# Patient Record
Sex: FEMALE | Race: WHITE | Employment: FULL TIME | URBAN - METROPOLITAN AREA
[De-identification: names, ages, dates, MRNs, and addresses within clinical notes are randomized per-mention and may not be internally consistent; named-entity substitution may affect disease eponyms.]

---

## 2019-03-02 ENCOUNTER — LAB ENCOUNTER (OUTPATIENT)
Dept: LAB | Age: 44
End: 2019-03-02
Attending: FAMILY MEDICINE
Payer: COMMERCIAL

## 2019-03-02 ENCOUNTER — OFFICE VISIT (OUTPATIENT)
Dept: FAMILY MEDICINE CLINIC | Facility: CLINIC | Age: 44
End: 2019-03-02

## 2019-03-02 VITALS
WEIGHT: 147.19 LBS | DIASTOLIC BLOOD PRESSURE: 72 MMHG | HEART RATE: 58 BPM | TEMPERATURE: 99 F | SYSTOLIC BLOOD PRESSURE: 110 MMHG | BODY MASS INDEX: 24.82 KG/M2 | HEIGHT: 64.5 IN

## 2019-03-02 DIAGNOSIS — Z01.419 ENCOUNTER FOR GYNECOLOGICAL EXAMINATION: ICD-10-CM

## 2019-03-02 DIAGNOSIS — Z12.31 ENCOUNTER FOR SCREENING MAMMOGRAM FOR BREAST CANCER: ICD-10-CM

## 2019-03-02 DIAGNOSIS — Z00.00 ENCOUNTER FOR HEALTH MAINTENANCE EXAMINATION IN ADULT: Primary | ICD-10-CM

## 2019-03-02 DIAGNOSIS — Z12.11 ENCOUNTER FOR SCREENING FECAL OCCULT BLOOD TESTING: ICD-10-CM

## 2019-03-02 DIAGNOSIS — Z00.00 ENCOUNTER FOR HEALTH MAINTENANCE EXAMINATION IN ADULT: ICD-10-CM

## 2019-03-02 LAB
ALBUMIN SERPL-MCNC: 4.3 G/DL (ref 3.4–5)
ALBUMIN/GLOB SERPL: 1.4 {RATIO} (ref 1–2)
ALP LIVER SERPL-CCNC: 43 U/L (ref 37–98)
ALT SERPL-CCNC: 22 U/L (ref 13–56)
ANION GAP SERPL CALC-SCNC: 7 MMOL/L (ref 0–18)
AST SERPL-CCNC: 34 U/L (ref 15–37)
BASOPHILS # BLD AUTO: 0.09 X10(3) UL (ref 0–0.2)
BASOPHILS NFR BLD AUTO: 1.4 %
BILIRUB SERPL-MCNC: 0.4 MG/DL (ref 0.1–2)
BUN BLD-MCNC: 13 MG/DL (ref 7–18)
BUN/CREAT SERPL: 15.9 (ref 10–20)
CALCIUM BLD-MCNC: 8.9 MG/DL (ref 8.5–10.1)
CHLORIDE SERPL-SCNC: 106 MMOL/L (ref 98–107)
CHOLEST SMN-MCNC: 202 MG/DL (ref ?–200)
CO2 SERPL-SCNC: 26 MMOL/L (ref 21–32)
CREAT BLD-MCNC: 0.82 MG/DL (ref 0.55–1.02)
DEPRECATED RDW RBC AUTO: 39.2 FL (ref 35.1–46.3)
EOSINOPHIL # BLD AUTO: 0.15 X10(3) UL (ref 0–0.7)
EOSINOPHIL NFR BLD AUTO: 2.3 %
ERYTHROCYTE [DISTWIDTH] IN BLOOD BY AUTOMATED COUNT: 11.9 % (ref 11–15)
GLOBULIN PLAS-MCNC: 3.1 G/DL (ref 2.8–4.4)
GLUCOSE BLD-MCNC: 85 MG/DL (ref 70–99)
HCT VFR BLD AUTO: 37.9 % (ref 35–48)
HDLC SERPL-MCNC: 97 MG/DL (ref 40–59)
HGB BLD-MCNC: 12.5 G/DL (ref 12–16)
IMM GRANULOCYTES # BLD AUTO: 0.08 X10(3) UL (ref 0–1)
IMM GRANULOCYTES NFR BLD: 1.3 %
LDLC SERPL CALC-MCNC: 97 MG/DL (ref ?–100)
LYMPHOCYTES # BLD AUTO: 1.23 X10(3) UL (ref 1–4)
LYMPHOCYTES NFR BLD AUTO: 19.2 %
M PROTEIN MFR SERPL ELPH: 7.4 G/DL (ref 6.4–8.2)
MCH RBC QN AUTO: 29.8 PG (ref 26–34)
MCHC RBC AUTO-ENTMCNC: 33 G/DL (ref 31–37)
MCV RBC AUTO: 90.5 FL (ref 80–100)
MONOCYTES # BLD AUTO: 0.57 X10(3) UL (ref 0.1–1)
MONOCYTES NFR BLD AUTO: 8.9 %
NEUTROPHILS # BLD AUTO: 4.27 X10 (3) UL (ref 1.5–7.7)
NEUTROPHILS # BLD AUTO: 4.27 X10(3) UL (ref 1.5–7.7)
NEUTROPHILS NFR BLD AUTO: 66.9 %
NONHDLC SERPL-MCNC: 105 MG/DL (ref ?–130)
OSMOLALITY SERPL CALC.SUM OF ELEC: 287 MOSM/KG (ref 275–295)
PLATELET # BLD AUTO: 324 10(3)UL (ref 150–450)
POTASSIUM SERPL-SCNC: 4.9 MMOL/L (ref 3.5–5.1)
RBC # BLD AUTO: 4.19 X10(6)UL (ref 3.8–5.3)
SODIUM SERPL-SCNC: 139 MMOL/L (ref 136–145)
T4 FREE SERPL-MCNC: 0.9 NG/DL (ref 0.8–1.7)
TRIGL SERPL-MCNC: 41 MG/DL (ref 30–149)
TSI SER-ACNC: 2.74 MIU/ML (ref 0.36–3.74)
VIT B12 SERPL-MCNC: 547 PG/ML (ref 193–986)
VIT D+METAB SERPL-MCNC: 26.7 NG/ML (ref 30–100)
VLDLC SERPL CALC-MCNC: 8 MG/DL (ref 0–30)
WBC # BLD AUTO: 6.4 X10(3) UL (ref 4–11)

## 2019-03-02 PROCEDURE — 80061 LIPID PANEL: CPT

## 2019-03-02 PROCEDURE — 99386 PREV VISIT NEW AGE 40-64: CPT | Performed by: NURSE PRACTITIONER

## 2019-03-02 PROCEDURE — 84443 ASSAY THYROID STIM HORMONE: CPT

## 2019-03-02 PROCEDURE — 87624 HPV HI-RISK TYP POOLED RSLT: CPT | Performed by: NURSE PRACTITIONER

## 2019-03-02 PROCEDURE — 36415 COLL VENOUS BLD VENIPUNCTURE: CPT

## 2019-03-02 PROCEDURE — 85025 COMPLETE CBC W/AUTO DIFF WBC: CPT

## 2019-03-02 PROCEDURE — G0439 PPPS, SUBSEQ VISIT: HCPCS | Performed by: NURSE PRACTITIONER

## 2019-03-02 PROCEDURE — 80053 COMPREHEN METABOLIC PANEL: CPT

## 2019-03-02 PROCEDURE — 82306 VITAMIN D 25 HYDROXY: CPT

## 2019-03-02 PROCEDURE — 88175 CYTOPATH C/V AUTO FLUID REDO: CPT | Performed by: NURSE PRACTITIONER

## 2019-03-02 PROCEDURE — 82607 VITAMIN B-12: CPT

## 2019-03-02 PROCEDURE — 84439 ASSAY OF FREE THYROXINE: CPT

## 2019-03-02 NOTE — PROGRESS NOTES
HPI:    Patient ID: Froy Painting is a 38year old female. HPI patient patient presents today as a new patient returning from several years ago. Overall she feels well, denies complaints. She has not had a visit in 4 years.   She would like a Pap sm normal, S2 normal, normal heart sounds, intact distal pulses and normal pulses. No murmur heard. Pulses:       Dorsalis pedis pulses are 2+ on the right side, and 2+ on the left side.         Posterior tibial pulses are 2+ on the right side, and 2+ on th diagnosis)  Encounter for gynecological examination  Encounter for screening mammogram for breast cancer  Encounter for screening fecal occult blood testing    Orders Placed This Encounter      CBC W Differential W Platelet [E]      Comp Metabolic Panel (1

## 2019-03-02 NOTE — PATIENT INSTRUCTIONS
Pap smear obtained today with HPV test–results should be back within a week we will notify you. Fasting blood work done today–results should be back on Monday and we will notify you.     Please schedule your screening mammogram here at [place] [id number]

## 2019-03-04 LAB — HPV I/H RISK 1 DNA SPEC QL NAA+PROBE: NEGATIVE

## 2019-03-05 ENCOUNTER — TELEPHONE (OUTPATIENT)
Dept: FAMILY MEDICINE CLINIC | Facility: CLINIC | Age: 44
End: 2019-03-05

## 2019-03-05 NOTE — TELEPHONE ENCOUNTER
----- Message from ERNESTO Schwartz sent at 3/5/2019  4:45 PM CST -----  Please notify patient that her Pap smear is within normal limits. And her HPV is negative. Recommend annual physical, will discuss need for Pap at physical next year.   Nicole hidalgo

## 2019-03-05 NOTE — TELEPHONE ENCOUNTER
----- Message from ERNESTO Garces sent at 3/5/2019  8:46 AM CST -----  Please notify patient that overall her labs are normal.  Her vitamin B12 is good, thyroid is good, CMP is normal, CBC is normal  Her cholesterol is 202–however this is good catracho

## 2019-03-08 NOTE — TELEPHONE ENCOUNTER
Patient notified of results and recommendations and expressed understanding.     Jame Quinonez, 03/08/19, 4:10 PM

## 2019-03-25 ENCOUNTER — HOSPITAL ENCOUNTER (OUTPATIENT)
Dept: MAMMOGRAPHY | Age: 44
Discharge: HOME | End: 2019-03-25
Attending: NURSE PRACTITIONER
Payer: COMMERCIAL

## 2019-03-25 DIAGNOSIS — Z12.31 ENCOUNTER FOR SCREENING MAMMOGRAM FOR BREAST CANCER: ICD-10-CM

## 2019-03-25 PROCEDURE — 77063 BREAST TOMOSYNTHESIS BI: CPT | Performed by: NURSE PRACTITIONER

## 2019-03-25 PROCEDURE — 77067 SCR MAMMO BI INCL CAD: CPT | Performed by: NURSE PRACTITIONER

## 2022-01-26 ENCOUNTER — TELEPHONE (OUTPATIENT)
Dept: FAMILY MEDICINE CLINIC | Facility: CLINIC | Age: 47
End: 2022-01-26

## 2022-01-26 NOTE — TELEPHONE ENCOUNTER
Pt seen once in Epic with [facility] in March 2019. Pt states she had URI s/s back 12/27. Pt had nasal drainage and cough. Pt has had a linger cough since. Pt also has chest discomfort/pressure.     Pt donated blood over the weekend (states she has done so

## 2022-01-26 NOTE — TELEPHONE ENCOUNTER
pt has been having high bp- made appt for tomorrow- answered yes to cough but states it is a chronic cough sinc december- also stated yes to SOB but states it may be due to BP issues- ok to keep?  Has not been seen since 2019, still w/in 3 yr time frame bef

## 2022-01-27 ENCOUNTER — OFFICE VISIT (OUTPATIENT)
Dept: FAMILY MEDICINE CLINIC | Facility: CLINIC | Age: 47
End: 2022-01-27
Payer: COMMERCIAL

## 2022-01-27 VITALS
SYSTOLIC BLOOD PRESSURE: 128 MMHG | TEMPERATURE: 98 F | HEART RATE: 66 BPM | HEIGHT: 64.5 IN | OXYGEN SATURATION: 98 % | RESPIRATION RATE: 20 BRPM | DIASTOLIC BLOOD PRESSURE: 88 MMHG | WEIGHT: 151 LBS | BODY MASS INDEX: 25.46 KG/M2

## 2022-01-27 DIAGNOSIS — Z00.00 ENCOUNTER FOR HEALTH MAINTENANCE EXAMINATION IN ADULT: Primary | ICD-10-CM

## 2022-01-27 DIAGNOSIS — R03.0 ELEVATED BLOOD PRESSURE READING: Primary | ICD-10-CM

## 2022-01-27 PROCEDURE — 99214 OFFICE O/P EST MOD 30 MIN: CPT | Performed by: NURSE PRACTITIONER

## 2022-01-27 PROCEDURE — 3079F DIAST BP 80-89 MM HG: CPT | Performed by: NURSE PRACTITIONER

## 2022-01-27 PROCEDURE — 3074F SYST BP LT 130 MM HG: CPT | Performed by: NURSE PRACTITIONER

## 2022-01-27 PROCEDURE — 3008F BODY MASS INDEX DOCD: CPT | Performed by: NURSE PRACTITIONER

## 2022-01-27 PROCEDURE — 93000 ELECTROCARDIOGRAM COMPLETE: CPT | Performed by: NURSE PRACTITIONER

## 2022-01-27 NOTE — PROGRESS NOTES
Kati Javier is a 40year old female.   Patient presents with:  Blood Pressure: 150/72 sunday, 158/78 yesterday  Cough: started 1 month ago, didnt take covid test  Chest Pain Angina: pressure on chest, shortness of breath      HPI:   Complaints of eleva 4.5\" (1.638 m)   Wt 151 lb (68.5 kg)   SpO2 98%   BMI 25.52 kg/m²   GENERAL: well developed, well nourished,in no apparent distress  SKIN: no rashes,no suspicious lesions  HEENT: atraumatic, normocephalic,ears and throat are clear  NECK: supple,no adenopa provider visit if you need help in using it the correct way. The steps below are general instructions for using an automatic digital monitor. Step 1. Relax    · Take your blood pressure at the same time every day, such as in the morning or evening.  Or ta the machine. · Bring your blood pressure records with you to each healthcare provider visit. · If you start a new blood pressure medicine, note the day you started the new medicine. Also note the day if you change the dose of your medicine.  Measure your

## 2022-01-27 NOTE — PATIENT INSTRUCTIONS
Monitor blood pressure -  Call with numbers in 1 week - if continue to be 140/90 -       Taking Your Blood Pressure  Blood pressure is the force of blood against the artery wall as it moves from the heart through the blood vessels.  You can take your own bl blood pressure. Step 2. Wrap the cuff    · Place your arm on the table, palm up. Your arm should be at the level of your heart. Wrap the cuff around your upper arm, just above your elbow. It’s best done on bare skin, not over clothing.  Most cuffs will danna professional's instructions.

## 2022-02-01 ENCOUNTER — TELEPHONE (OUTPATIENT)
Dept: FAMILY MEDICINE CLINIC | Facility: CLINIC | Age: 47
End: 2022-02-01

## 2022-02-01 NOTE — TELEPHONE ENCOUNTER
Pt states that she has a lab appt at [address] [place] [address] this Fri. Would like lab orders to be faxed.

## 2022-02-04 ENCOUNTER — LABORATORY ENCOUNTER (OUTPATIENT)
Dept: LAB | Age: 47
End: 2022-02-04
Attending: NURSE PRACTITIONER
Payer: COMMERCIAL

## 2022-02-04 ENCOUNTER — TELEPHONE (OUTPATIENT)
Dept: FAMILY MEDICINE CLINIC | Facility: CLINIC | Age: 47
End: 2022-02-04

## 2022-02-04 DIAGNOSIS — Z00.00 ENCOUNTER FOR HEALTH MAINTENANCE EXAMINATION IN ADULT: ICD-10-CM

## 2022-02-04 LAB
ALBUMIN SERPL-MCNC: 4.5 G/DL (ref 3.4–5)
ALBUMIN/GLOB SERPL: 1.6 {RATIO} (ref 1–2)
ALT SERPL-CCNC: 15 U/L
AST SERPL-CCNC: 18 U/L (ref 15–37)
BASOPHILS # BLD AUTO: 0.09 X10(3) UL (ref 0–0.2)
BASOPHILS NFR BLD AUTO: 1.5 %
BILIRUB SERPL-MCNC: 0.8 MG/DL (ref 0.1–2)
BUN BLD-MCNC: 15 MG/DL (ref 7–18)
CALCIUM BLD-MCNC: 10.5 MG/DL (ref 8.5–10.1)
CHLORIDE SERPL-SCNC: 104 MMOL/L (ref 98–112)
CHOLEST SERPL-MCNC: 200 MG/DL (ref ?–200)
CO2 SERPL-SCNC: 28 MMOL/L (ref 21–32)
CREAT BLD-MCNC: 0.87 MG/DL
EOSINOPHIL # BLD AUTO: 0.15 X10(3) UL (ref 0–0.7)
EOSINOPHIL NFR BLD AUTO: 2.5 %
ERYTHROCYTE [DISTWIDTH] IN BLOOD BY AUTOMATED COUNT: 12 %
FASTING PATIENT LIPID ANSWER: YES
FASTING STATUS PATIENT QL REPORTED: YES
GLOBULIN PLAS-MCNC: 2.9 G/DL (ref 2.8–4.4)
GLUCOSE BLD-MCNC: 89 MG/DL (ref 70–99)
HCT VFR BLD AUTO: 41.3 %
HDLC SERPL-MCNC: 95 MG/DL (ref 40–59)
HGB BLD-MCNC: 13.4 G/DL
IMM GRANULOCYTES # BLD AUTO: 0.06 X10(3) UL (ref 0–1)
IMM GRANULOCYTES NFR BLD: 1 %
LDLC SERPL CALC-MCNC: 96 MG/DL (ref ?–100)
LYMPHOCYTES # BLD AUTO: 1.63 X10(3) UL (ref 1–4)
LYMPHOCYTES NFR BLD AUTO: 26.9 %
MCH RBC QN AUTO: 29.7 PG (ref 26–34)
MCHC RBC AUTO-ENTMCNC: 32.4 G/DL (ref 31–37)
MCV RBC AUTO: 91.6 FL
MONOCYTES # BLD AUTO: 0.5 X10(3) UL (ref 0.1–1)
MONOCYTES NFR BLD AUTO: 8.3 %
NEUTROPHILS # BLD AUTO: 3.63 X10 (3) UL (ref 1.5–7.7)
NEUTROPHILS # BLD AUTO: 3.63 X10(3) UL (ref 1.5–7.7)
NEUTROPHILS NFR BLD AUTO: 59.8 %
NONHDLC SERPL-MCNC: 105 MG/DL (ref ?–130)
OSMOLALITY SERPL CALC.SUM OF ELEC: 286 MOSM/KG (ref 275–295)
PLATELET # BLD AUTO: 338 10(3)UL (ref 150–450)
POTASSIUM SERPL-SCNC: 4.7 MMOL/L (ref 3.5–5.1)
PROT SERPL-MCNC: 7.4 G/DL (ref 6.4–8.2)
RBC # BLD AUTO: 4.51 X10(6)UL
SODIUM SERPL-SCNC: 138 MMOL/L (ref 136–145)
T4 FREE SERPL-MCNC: 1 NG/DL (ref 0.8–1.7)
TRIGL SERPL-MCNC: 49 MG/DL (ref 30–149)
TSI SER-ACNC: 3.53 MIU/ML (ref 0.36–3.74)
VIT D+METAB SERPL-MCNC: 34.4 NG/ML (ref 30–100)
VLDLC SERPL CALC-MCNC: 8 MG/DL (ref 0–30)
WBC # BLD AUTO: 6.1 X10(3) UL (ref 4–11)

## 2022-02-04 PROCEDURE — 84443 ASSAY THYROID STIM HORMONE: CPT

## 2022-02-04 PROCEDURE — 82565 ASSAY OF CREATININE: CPT | Performed by: NURSE PRACTITIONER

## 2022-02-04 PROCEDURE — 82306 VITAMIN D 25 HYDROXY: CPT

## 2022-02-04 PROCEDURE — 80061 LIPID PANEL: CPT

## 2022-02-04 PROCEDURE — 82310 ASSAY OF CALCIUM: CPT | Performed by: NURSE PRACTITIONER

## 2022-02-04 PROCEDURE — 84439 ASSAY OF FREE THYROXINE: CPT

## 2022-02-04 PROCEDURE — 36415 COLL VENOUS BLD VENIPUNCTURE: CPT

## 2022-02-04 PROCEDURE — 85025 COMPLETE CBC W/AUTO DIFF WBC: CPT

## 2022-02-04 PROCEDURE — 80053 COMPREHEN METABOLIC PANEL: CPT

## 2022-02-04 PROCEDURE — 83970 ASSAY OF PARATHORMONE: CPT | Performed by: NURSE PRACTITIONER

## 2022-02-04 PROCEDURE — 84100 ASSAY OF PHOSPHORUS: CPT | Performed by: NURSE PRACTITIONER

## 2022-02-04 NOTE — TELEPHONE ENCOUNTER
Informed pt of Pauline's recommendations. Pt agrees to treatment plan and verbalizes understanding. No further questions.

## 2022-02-04 NOTE — TELEPHONE ENCOUNTER
See OV notes from 1/27/2022. Patient was advised to call office with Bp readings in 1 week. Please advise.

## 2022-02-04 NOTE — TELEPHONE ENCOUNTER
Detailed message left informing patient that Dr. Poornima Cardona reviewed her bp readings and didn't have any concerns at this time. Informed I will route readings to [facility] to review on Monday. Asked for a c/b if any questions or concerns today. Please advise.

## 2022-02-04 NOTE — TELEPHONE ENCOUNTER
Please notify patient blood pressures are looking good continue to monitor call if blood pressures are consistently greater than 140/90

## 2022-02-04 NOTE — TELEPHONE ENCOUNTER
Pt stated charlee wanted her bp readings: sunday 114/67- tues 153/88-  wed 109/76- thur 112/71- this morning, Friday-  125/81

## 2022-02-07 ENCOUNTER — TELEPHONE (OUTPATIENT)
Dept: FAMILY MEDICINE CLINIC | Facility: CLINIC | Age: 47
End: 2022-02-07

## 2022-02-07 ENCOUNTER — PATIENT MESSAGE (OUTPATIENT)
Dept: FAMILY MEDICINE CLINIC | Facility: CLINIC | Age: 47
End: 2022-02-07

## 2022-02-07 NOTE — TELEPHONE ENCOUNTER
----- Message from ERNESTO Zhao sent at 2/7/2022  8:14 AM CST -----  Please make sure patient received my chart message as below-   Please fax order to [phone] - I don't think it can be added - but maybe try?   -  Your blood work - shows your calcium is elevated at 10.5 - I would recommend  that you come back in for a parathyroid blood test.   Your cholesterol is a little high at 200,  however your good cholesterol (HDL's) are really good at 95, LDL's are good at 96, Triglycerides are good at 49.     Thyroid is normal, vitamin D is good at 34.4, CBC is normal   Thank you,   Gabriel OROZCO, FNP-BC

## 2022-02-07 NOTE — TELEPHONE ENCOUNTER
----- Message from ERNESTO Figueroa sent at 2/7/2022 10:26 AM CST -----  Please make sure patient receives my chart message as below-  Your parathyroid hormone came back normal-this is not the reason for your elevated calcium. If you are taking calcium supplements I would recommend that you stop. Can have repeat calcium levels next time you have blood work. Thank Byron OROZCO, FNP-BC

## 2022-02-07 NOTE — TELEPHONE ENCOUNTER
From: Sera Austin  To: ERNESTO Cowan  Sent: 2/7/2022 8:20 AM CST  Subject: Question regarding LIPID PANEL    Can the parathyroid test be done at my next visit 2/11?

## 2022-02-07 NOTE — TELEPHONE ENCOUNTER
Spoke to pt advised of [facility] lab results, pt stated she takes a multivitamin but no additional calcium. Pt also stated she does not eat foods rich in calcium excessively.  Pt to discuss in further detail at upcoming physical.

## 2022-02-11 ENCOUNTER — OFFICE VISIT (OUTPATIENT)
Dept: FAMILY MEDICINE CLINIC | Facility: CLINIC | Age: 47
End: 2022-02-11
Payer: COMMERCIAL

## 2022-02-11 VITALS
OXYGEN SATURATION: 98 % | TEMPERATURE: 97 F | HEART RATE: 60 BPM | RESPIRATION RATE: 16 BRPM | SYSTOLIC BLOOD PRESSURE: 120 MMHG | DIASTOLIC BLOOD PRESSURE: 70 MMHG | BODY MASS INDEX: 26.39 KG/M2 | WEIGHT: 150.81 LBS | HEIGHT: 63.25 IN

## 2022-02-11 DIAGNOSIS — Z00.00 ENCOUNTER FOR HEALTH MAINTENANCE EXAMINATION IN ADULT: Primary | ICD-10-CM

## 2022-02-11 DIAGNOSIS — E83.52 HYPERCALCEMIA: ICD-10-CM

## 2022-02-11 DIAGNOSIS — Z12.11 SCREENING FOR MALIGNANT NEOPLASM OF COLON: ICD-10-CM

## 2022-02-11 DIAGNOSIS — Z12.31 ENCOUNTER FOR SCREENING MAMMOGRAM FOR BREAST CANCER: ICD-10-CM

## 2022-02-11 DIAGNOSIS — Z01.419 ENCOUNTER FOR GYNECOLOGICAL EXAMINATION: ICD-10-CM

## 2022-02-11 PROCEDURE — 88175 CYTOPATH C/V AUTO FLUID REDO: CPT | Performed by: NURSE PRACTITIONER

## 2022-02-11 PROCEDURE — 3008F BODY MASS INDEX DOCD: CPT | Performed by: NURSE PRACTITIONER

## 2022-02-11 PROCEDURE — 87624 HPV HI-RISK TYP POOLED RSLT: CPT | Performed by: NURSE PRACTITIONER

## 2022-02-11 PROCEDURE — 99396 PREV VISIT EST AGE 40-64: CPT | Performed by: NURSE PRACTITIONER

## 2022-02-11 PROCEDURE — 3078F DIAST BP <80 MM HG: CPT | Performed by: NURSE PRACTITIONER

## 2022-02-11 PROCEDURE — 3074F SYST BP LT 130 MM HG: CPT | Performed by: NURSE PRACTITIONER

## 2022-02-11 NOTE — PATIENT INSTRUCTIONS
Stop MVI, and Tums - may try pepcid (famotadine) for acid reflux- no tums or calcium     Follow up for repeat calcium levels     Mammogram - follow up at [address]     Colonoscopy- see referral     Pap smear today - results will be back within a week

## 2022-02-14 LAB — HPV I/H RISK 1 DNA SPEC QL NAA+PROBE: NEGATIVE

## 2022-02-25 ENCOUNTER — LABORATORY ENCOUNTER (OUTPATIENT)
Dept: LAB | Age: 47
End: 2022-02-25
Attending: NURSE PRACTITIONER
Payer: COMMERCIAL

## 2022-02-25 LAB — CALCIUM BLD-MCNC: 9.4 MG/DL (ref 8.5–10.1)

## 2022-02-25 PROCEDURE — 36415 COLL VENOUS BLD VENIPUNCTURE: CPT | Performed by: NURSE PRACTITIONER

## 2022-02-25 PROCEDURE — 82310 ASSAY OF CALCIUM: CPT | Performed by: NURSE PRACTITIONER

## 2022-02-28 ENCOUNTER — TELEPHONE (OUTPATIENT)
Dept: FAMILY MEDICINE CLINIC | Facility: CLINIC | Age: 47
End: 2022-02-28

## 2022-02-28 NOTE — TELEPHONE ENCOUNTER
----- Message from ERNESTO Huizar sent at 2/27/2022  5:17 PM CST -----  My chart message sent -  Your calcium is back to normal   Thank you,   Zev OROZCO, FNP-BC

## 2022-03-31 NOTE — TELEPHONE ENCOUNTER
Called pt and lm to return the call or msg via [employer]. Pt needs to be informed of the pap results.

## (undated) NOTE — LETTER
07/08/19        Elieser Jacobo  [address] [facility] [address] [place]      Dear Cori Lam,    [id number] records indicate that you have outstanding lab work and or testing that was ordered for you and has not yet been completed:  Orders Placed This Encounter